# Patient Record
Sex: FEMALE | Race: BLACK OR AFRICAN AMERICAN | Employment: OTHER | URBAN - METROPOLITAN AREA
[De-identification: names, ages, dates, MRNs, and addresses within clinical notes are randomized per-mention and may not be internally consistent; named-entity substitution may affect disease eponyms.]

---

## 2023-01-16 ENCOUNTER — HOSPITAL ENCOUNTER (EMERGENCY)
Age: 23
Discharge: HOME | End: 2023-01-16
Attending: EMERGENCY MEDICINE
Payer: OTHER GOVERNMENT

## 2023-01-16 ENCOUNTER — APPOINTMENT (OUTPATIENT)
Dept: ULTRASOUND IMAGING | Age: 23
End: 2023-01-16
Attending: EMERGENCY MEDICINE
Payer: OTHER GOVERNMENT

## 2023-01-16 VITALS
RESPIRATION RATE: 20 BRPM | HEIGHT: 66 IN | DIASTOLIC BLOOD PRESSURE: 67 MMHG | TEMPERATURE: 98.6 F | WEIGHT: 153 LBS | SYSTOLIC BLOOD PRESSURE: 113 MMHG | OXYGEN SATURATION: 99 % | BODY MASS INDEX: 24.59 KG/M2 | HEART RATE: 121 BPM

## 2023-01-16 DIAGNOSIS — R10.9 ABDOMINAL PAIN AFFECTING PREGNANCY: Primary | ICD-10-CM

## 2023-01-16 DIAGNOSIS — O26.899 ABDOMINAL PAIN AFFECTING PREGNANCY: Primary | ICD-10-CM

## 2023-01-16 LAB
APPEARANCE UR: CLEAR
BACTERIA URNS QL MICRO: NEGATIVE /HPF
BILIRUB UR QL: NEGATIVE
COLOR UR: YELLOW
EPITH CASTS URNS QL MICRO: NORMAL /LPF
GLUCOSE UR STRIP.AUTO-MCNC: NEGATIVE MG/DL
HGB UR QL STRIP: NEGATIVE
KETONES UR QL STRIP.AUTO: NEGATIVE MG/DL
LEUKOCYTE ESTERASE UR QL STRIP.AUTO: NEGATIVE
NITRITE UR QL STRIP.AUTO: NEGATIVE
PH UR STRIP: 7 (ref 5–8)
PROT UR STRIP-MCNC: NEGATIVE MG/DL
RBC #/AREA URNS HPF: NORMAL /HPF (ref 0–5)
SP GR UR REFRACTOMETRY: 1 (ref 1–1.03)
SP GR UR REFRACTOMETRY: 1 (ref 1–1.03)
UROBILINOGEN UR QL STRIP.AUTO: 0.1 EU/DL (ref 0.1–1)
WBC URNS QL MICRO: NORMAL /HPF (ref 0–4)

## 2023-01-16 PROCEDURE — 76815 OB US LIMITED FETUS(S): CPT

## 2023-01-16 PROCEDURE — 81003 URINALYSIS AUTO W/O SCOPE: CPT

## 2023-01-16 PROCEDURE — 99284 EMERGENCY DEPT VISIT MOD MDM: CPT

## 2023-01-16 NOTE — ED PROVIDER NOTES
[facility] EMERGENCY DEPT  EMERGENCY DEPARTMENT HISTORY AND PHYSICAL EXAM      Date: 1/16/2023  Patient Name: Denisa Jha  MRN: [id number]  [place]: 2000  Date of evaluation: 1/16/2023  Provider: Torsten Castillo MD   Note Started: 3:41 PM 1/16/23    HISTORY OF PRESENT ILLNESS     Chief Complaint   Patient presents with    Pregnancy Problem    Vaginal Bleeding       History Provided By: Patient    HPI: Denisa Jha, 21 y.o. female presents to the emergency department complaint of lower abdominal cramping/pain x3 days. Patient ports some mild vaginal bleeding/spotting since last night. Patient states she is approximately 16 weeks pregnant, does not have local obstetrics that she is here for work for the next 4 months. Patient denies fevers or chills, denies nausea or vomiting. PAST MEDICAL HISTORY   Past Medical History:  Denies    Past Surgical History:  Denies    Family History:  No family history on file. Social History:  Visiting locally for work, employed    Allergies:  No Known Allergies    PCP: Other, MD Sydnee    Current Meds:   Previous Medications    No medications on file       REVIEW OF SYSTEMS   Review of Systems  Positives and Pertinent negatives as per HPI. PHYSICAL EXAM     ED Triage Vitals [01/16/23 1427]   ED Encounter Vitals Group      /67      Pulse (Heart Rate) (!) 121      Resp Rate 20      Temp 98.6 °F (37 °C)      Temp src       O2 Sat (%) 99 %      Weight 153 lb      Height 5' 6\"      Physical Exam  Physical Exam  Constitutional:       General: No acute distress. Appearance: Normal appearance. Not toxic-appearing. HENT:      Head: Normocephalic and atraumatic. Nose: Nose normal.      Mouth/Throat:      Mouth: Mucous membranes are moist.   Eyes:      Extraocular Movements: Extraocular movements intact. Pupils: Pupils are equal, round, and reactive to light. Cardiovascular:      Rate and Rhythm: Normal rate. Pulses: Normal pulses. Pulmonary:      Effort: Pulmonary effort is normal.      Breath sounds: No stridor. Abdominal:      General: Abdomen is flat. There is no distension. Musculoskeletal:         General: Normal range of motion. Cervical back: Normal range of motion and neck supple. Skin:     General: Skin is warm and dry. Capillary Refill: Capillary refill takes less than 2 seconds. Neurological:      General: No focal deficit present. Mental Status: Aert and oriented to person, place, and time. Psychiatric:         Mood and Affect: Mood normal.         Behavior: Behavior normal.     SCREENINGS               No data recorded        LAB, EKG AND DIAGNOSTIC RESULTS   Labs:  No results found for this or any previous visit (from the past 12 hour(s)). Radiologic Studies:  Non-plain film images such as CT, Ultrasound and MRI are read by the radiologist. Plain radiographic images are visualized and preliminarily interpreted by the ED Provider with the below findings:    *    Interpretation per the Radiologist below, if available at the time of this note:  [employer]    Result Date: 1/16/2023  INDICATION: 16 weeks pregnant, cramping and vaginal bleeding COMPARISON: None EXAM: Real-time transabdominal ultrasound of the gravid uterus. FINDINGS: A gravid uterus is demonstrated with anterior placenta showing uniform echotexture. Amniotic fluid volume is normal. There is transverse positioning of a single fetus. Fetal cardiac activity is shown measuring 161 bpm. Estimation of fetal age by measurement of biparietal diameter, head circumference, abdominal circumference and femur length yielding an estimated gestational age of 12 weeks, 4 days +/- 1 week 2-5 days. Fetal movement was shown. The cervix is not well evaluated on this study. The ovaries are not demonstrated, obscured by bowel gas and lack of urinary bladder distention, though no adnexal mass is shown. No free intrapelvic fluid is demonstrated. Viable 16 week, 4 day single intrauterine pregnancy. Cervical integrity is not determined on this study. ED COURSE and DIFFERENTIAL DIAGNOSIS/MDM   Vitals:    Vitals:    01/16/23 1427   BP: 113/67   Pulse: (!) 121   Resp: 20   Temp: 98.6 °F (37 °C)   SpO2: 99%   Weight: 69.4 kg (153 lb)   Height: 5' 6\" (1.676 m)        Patient was given the following medications:  Medications - No data to display    CONSULTS: (Who and What was discussed)  None     Chronic Conditions: None  Social Determinants affecting Dx or Tx: None  Counseling:      Records Reviewed (source and summary of external notes): Nursing Notes    CC/HPI Summary, DDx, ED Course, and Reassessment: Patient presents with lower abdominal cramping but no bleeding or discharge. Soft abdomen. Reassuring ultrasound and negative urinalysis. Patient is visiting here so does not have close obstetrics. Patient does states she has prenatal vitamins and Zofran at home as needed. We will give OB referral, return precautions. Disposition Considerations (Tests not done, Shared Decision Making, Pt Expectation of Test or Treatment.):  Discussed these findings with the patient. FINAL IMPRESSION     1. Abdominal pain affecting pregnancy          DISPOSITION/PLAN       Discharge Note: The patient is stable for discharge home. The signs, symptoms, diagnosis, and discharge instructions have been discussed, understanding conveyed, and agreed upon. The patient is to follow up as recommended or return to ER should their symptoms worsen. PATIENT REFERRED TO:  Follow-up Information    None           DISCHARGE MEDICATIONS:  There are no discharge medications for this patient. DISCONTINUED MEDICATIONS:  There are no discharge medications for this patient. I am the Primary Clinician of Record: Rosina Mcleod MD (electronically signed)    (Please note that parts of this dictation were completed with voice recognition software.  Quite often unanticipated grammatical, syntax, homophones, and other interpretive errors are inadvertently transcribed by the computer software. Please disregards these errors.  Please excuse any errors that have escaped final proofreading.)

## 2023-01-16 NOTE — DISCHARGE INSTRUCTIONS
Thank you! Thank you for allowing me to care for you in the emergency department. It is my goal to provide you with excellent care. If you have not received excellent quality care, please ask to speak to the nurse manager. Please fill out the survey that will come to you by mail or email since we listen to your feedback! Below you will find a list of your tests from today's visit. Should you have any questions, please do not hesitate to call the emergency department. Labs  Recent Results (from the past 12 hour(s))   URINALYSIS W/ RFLX MICROSCOPIC    Collection Time: 01/16/23  4:07 PM   Result Value Ref Range    Color Yellow     Appearance Clear Clear    Specific gravity 1.005 1.003 - 1.030      Specific gravity 1.005 1.003 - 1.030      pH (UA) 7.0 5.0 - 8.0      Protein Negative Negative mg/dL    Glucose Negative Negative mg/dL    Ketone Negative Negative mg/dL    Bilirubin Negative Negative      Blood Negative Negative      Urobilinogen 0.1 0.1 - 1.0 EU/dL    Nitrites Negative Negative      Leukocyte Esterase Negative Negative         Radiologic Studies  US PREG UTS LTD   Final Result   Viable 16 week, 4 day single intrauterine pregnancy. Cervical   integrity is not determined on this study. CT Results  (Last 48 hours)      None          CXR Results  (Last 48 hours)      None          ------------------------------------------------------------------------------------------------------------  The exam and treatment you received in the Emergency Department were for an urgent problem and are not intended as complete care. It is important that you follow-up with a doctor, nurse practitioner, or physician assistant to:  (1) confirm your diagnosis,  (2) re-evaluation of changes in your illness and treatment, and  (3) for ongoing care. Please take your discharge instructions with you when you go to your follow-up appointment.      If you have any problem arranging a follow-up appointment, contact the Emergency Department. If your symptoms become worse or you do not improve as expected and you are unable to reach your health care provider, please return to the Emergency Department. We are available 24 hours a day. If a prescription has been provided, please have it filled as soon as possible to prevent a delay in treatment. If you have any questions or reservations about taking the medication due to side effects or interactions with other medications, please call your primary care provider or contact the ER.

## 2023-01-16 NOTE — ED TRIAGE NOTES
States 16 weeks pregnant CHATO 7/1/2023. States started with cramping over the weekend, starting spotting last night. States soaking approx 1 pad an hour. Allie Wylie

## 2023-01-16 NOTE — Clinical Note
[facility] EMERGENCY DEPT  [address] [place] [id number]  [phone]    Work/School Note    Date: 1/16/2023    To Whom It May concern:    Cinthia Soliz was seen and treated today in the emergency room by the following provider(s):  Attending Provider: Fang Sanchez MD.      Cinthia Soliz is excused from work/school on 01/16/23 and 01/17/23. She is medically clear to return to work/school on 1/18/2023.        Sincerely,          Adithya Carpio MD

## 2023-01-18 ENCOUNTER — HOSPITAL ENCOUNTER (INPATIENT)
Age: 23
LOS: 1 days | Discharge: HOME | End: 2023-01-20
Attending: STUDENT IN AN ORGANIZED HEALTH CARE EDUCATION/TRAINING PROGRAM | Admitting: OBSTETRICS & GYNECOLOGY
Payer: OTHER GOVERNMENT

## 2023-01-18 ENCOUNTER — APPOINTMENT (OUTPATIENT)
Dept: ULTRASOUND IMAGING | Age: 23
End: 2023-01-18
Attending: STUDENT IN AN ORGANIZED HEALTH CARE EDUCATION/TRAINING PROGRAM
Payer: OTHER GOVERNMENT

## 2023-01-18 LAB
ANION GAP SERPL CALC-SCNC: 9 MMOL/L (ref 5–15)
BASOPHILS # BLD: 0 K/UL (ref 0–0.1)
BASOPHILS NFR BLD: 0 % (ref 0–1)
BUN SERPL-MCNC: 7 MG/DL (ref 6–20)
BUN/CREAT SERPL: 12 (ref 12–20)
CA-I BLD-MCNC: 9.1 MG/DL (ref 8.5–10.1)
CHLORIDE SERPL-SCNC: 103 MMOL/L (ref 97–108)
CO2 SERPL-SCNC: 22 MMOL/L (ref 21–32)
CREAT SERPL-MCNC: 0.58 MG/DL (ref 0.55–1.02)
DIFFERENTIAL METHOD BLD: ABNORMAL
EOSINOPHIL # BLD: 0.1 K/UL (ref 0–0.4)
EOSINOPHIL NFR BLD: 1 % (ref 0–7)
ERYTHROCYTE [DISTWIDTH] IN BLOOD BY AUTOMATED COUNT: 14.6 % (ref 11.5–14.5)
GLUCOSE SERPL-MCNC: 90 MG/DL (ref 65–100)
HCT VFR BLD AUTO: 30.8 % (ref 35–47)
HGB BLD-MCNC: 10.4 G/DL (ref 11.5–16)
IMM GRANULOCYTES # BLD AUTO: 0 K/UL (ref 0–0.04)
IMM GRANULOCYTES NFR BLD AUTO: 0 % (ref 0–0.5)
LYMPHOCYTES # BLD: 0.7 K/UL (ref 0.8–3.5)
LYMPHOCYTES NFR BLD: 7 % (ref 12–49)
MCH RBC QN AUTO: 27.8 PG (ref 26–34)
MCHC RBC AUTO-ENTMCNC: 33.8 G/DL (ref 30–36.5)
MCV RBC AUTO: 82.4 FL (ref 80–99)
MONOCYTES # BLD: 0.6 K/UL (ref 0–1)
MONOCYTES NFR BLD: 7 % (ref 5–13)
NEUTS SEG # BLD: 8.1 K/UL (ref 1.8–8)
NEUTS SEG NFR BLD: 85 % (ref 32–75)
NRBC # BLD: 0 K/UL (ref 0–0.01)
NRBC BLD-RTO: 0 PER 100 WBC
PLATELET # BLD AUTO: 146 K/UL (ref 150–400)
PMV BLD AUTO: 13.8 FL (ref 8.9–12.9)
POTASSIUM SERPL-SCNC: 4 MMOL/L (ref 3.5–5.1)
RBC # BLD AUTO: 3.74 M/UL (ref 3.8–5.2)
SODIUM SERPL-SCNC: 134 MMOL/L (ref 136–145)
WBC # BLD AUTO: 9.5 K/UL (ref 3.6–11)

## 2023-01-18 PROCEDURE — 36415 COLL VENOUS BLD VENIPUNCTURE: CPT

## 2023-01-18 PROCEDURE — 76815 OB US LIMITED FETUS(S): CPT

## 2023-01-18 PROCEDURE — 96375 TX/PRO/DX INJ NEW DRUG ADDON: CPT

## 2023-01-18 PROCEDURE — 96374 THER/PROPH/DIAG INJ IV PUSH: CPT

## 2023-01-18 PROCEDURE — 74011250636 HC RX REV CODE- 250/636: Performed by: STUDENT IN AN ORGANIZED HEALTH CARE EDUCATION/TRAINING PROGRAM

## 2023-01-18 PROCEDURE — 80048 BASIC METABOLIC PNL TOTAL CA: CPT

## 2023-01-18 PROCEDURE — 85025 COMPLETE CBC W/AUTO DIFF WBC: CPT

## 2023-01-18 PROCEDURE — 86900 BLOOD TYPING SEROLOGIC ABO: CPT

## 2023-01-18 PROCEDURE — 99285 EMERGENCY DEPT VISIT HI MDM: CPT

## 2023-01-18 RX ORDER — BUTORPHANOL TARTRATE 1 MG/ML
1 INJECTION INTRAMUSCULAR; INTRAVENOUS
Status: DISCONTINUED | OUTPATIENT
Start: 2023-01-19 | End: 2023-01-19

## 2023-01-18 RX ORDER — SODIUM CHLORIDE, SODIUM LACTATE, POTASSIUM CHLORIDE, CALCIUM CHLORIDE 600; 310; 30; 20 MG/100ML; MG/100ML; MG/100ML; MG/100ML
125 INJECTION, SOLUTION INTRAVENOUS CONTINUOUS
Status: DISCONTINUED | OUTPATIENT
Start: 2023-01-19 | End: 2023-01-20 | Stop reason: HOSPADM

## 2023-01-18 RX ORDER — MORPHINE SULFATE 4 MG/ML
4 INJECTION INTRAVENOUS ONCE
Status: COMPLETED | OUTPATIENT
Start: 2023-01-18 | End: 2023-01-18

## 2023-01-18 RX ADMIN — MORPHINE SULFATE 4 MG: 4 INJECTION, SOLUTION INTRAMUSCULAR; INTRAVENOUS at 23:13

## 2023-01-18 RX ADMIN — SODIUM CHLORIDE 1000 ML: 9 INJECTION, SOLUTION INTRAVENOUS at 23:14

## 2023-01-19 PROBLEM — Z34.90 PREGNANCY: Status: ACTIVE | Noted: 2023-01-19

## 2023-01-19 PROBLEM — O03.9 FETAL DEMISE DUE TO MISCARRIAGE: Status: ACTIVE | Noted: 2023-01-19

## 2023-01-19 LAB
ABO + RH BLD: NORMAL
BLOOD GROUP ANTIBODIES SERPL: NEGATIVE
SPECIMEN EXP DATE BLD: NORMAL

## 2023-01-19 PROCEDURE — 75410000002 HC LABOR FEE PER 1 HR

## 2023-01-19 PROCEDURE — 96366 THER/PROPH/DIAG IV INF ADDON: CPT

## 2023-01-19 PROCEDURE — G0378 HOSPITAL OBSERVATION PER HR: HCPCS

## 2023-01-19 PROCEDURE — 75410000000 HC DELIVERY VAGINAL/SINGLE

## 2023-01-19 PROCEDURE — 96365 THER/PROPH/DIAG IV INF INIT: CPT

## 2023-01-19 PROCEDURE — 74011000250 HC RX REV CODE- 250: Performed by: OBSTETRICS & GYNECOLOGY

## 2023-01-19 PROCEDURE — 87389 HIV-1 AG W/HIV-1&-2 AB AG IA: CPT

## 2023-01-19 PROCEDURE — 74011250636 HC RX REV CODE- 250/636

## 2023-01-19 PROCEDURE — 96376 TX/PRO/DX INJ SAME DRUG ADON: CPT

## 2023-01-19 PROCEDURE — 36415 COLL VENOUS BLD VENIPUNCTURE: CPT

## 2023-01-19 PROCEDURE — 96372 THER/PROPH/DIAG INJ SC/IM: CPT

## 2023-01-19 PROCEDURE — 88305 TISSUE EXAM BY PATHOLOGIST: CPT

## 2023-01-19 PROCEDURE — 96375 TX/PRO/DX INJ NEW DRUG ADDON: CPT

## 2023-01-19 PROCEDURE — 74011250636 HC RX REV CODE- 250/636: Performed by: OBSTETRICS & GYNECOLOGY

## 2023-01-19 PROCEDURE — 65410000002 HC RM PRIVATE OB

## 2023-01-19 PROCEDURE — 99221 1ST HOSP IP/OBS SF/LOW 40: CPT | Performed by: OBSTETRICS & GYNECOLOGY

## 2023-01-19 PROCEDURE — 75410000003 HC RECOV DEL/VAG/CSECN EA 0.5 HR

## 2023-01-19 PROCEDURE — 86592 SYPHILIS TEST NON-TREP QUAL: CPT

## 2023-01-19 RX ORDER — IBUPROFEN 800 MG/1
800 TABLET ORAL
Status: DISCONTINUED | OUTPATIENT
Start: 2023-01-19 | End: 2023-01-20 | Stop reason: HOSPADM

## 2023-01-19 RX ORDER — METHYLERGONOVINE MALEATE 0.2 MG/ML
INJECTION INTRAVENOUS
Status: COMPLETED
Start: 2023-01-19 | End: 2023-01-19

## 2023-01-19 RX ORDER — ONDANSETRON 2 MG/ML
4 INJECTION INTRAMUSCULAR; INTRAVENOUS
Status: DISCONTINUED | OUTPATIENT
Start: 2023-01-19 | End: 2023-01-20 | Stop reason: HOSPADM

## 2023-01-19 RX ORDER — NALOXONE HYDROCHLORIDE 0.4 MG/ML
0.4 INJECTION, SOLUTION INTRAMUSCULAR; INTRAVENOUS; SUBCUTANEOUS AS NEEDED
Status: DISCONTINUED | OUTPATIENT
Start: 2023-01-19 | End: 2023-01-19

## 2023-01-19 RX ORDER — OXYTOCIN/RINGER'S LACTATE 30/500 ML
87.3 PLASTIC BAG, INJECTION (ML) INTRAVENOUS AS NEEDED
Status: COMPLETED | OUTPATIENT
Start: 2023-01-19 | End: 2023-01-19

## 2023-01-19 RX ORDER — OXYTOCIN/RINGER'S LACTATE 30/500 ML
PLASTIC BAG, INJECTION (ML) INTRAVENOUS
Status: DISCONTINUED
Start: 2023-01-19 | End: 2023-01-19

## 2023-01-19 RX ORDER — ONDANSETRON 2 MG/ML
INJECTION INTRAMUSCULAR; INTRAVENOUS
Status: DISCONTINUED
Start: 2023-01-19 | End: 2023-01-19

## 2023-01-19 RX ORDER — BUTORPHANOL TARTRATE 1 MG/ML
INJECTION INTRAMUSCULAR; INTRAVENOUS
Status: COMPLETED
Start: 2023-01-19 | End: 2023-01-19

## 2023-01-19 RX ORDER — METHYLERGONOVINE MALEATE 0.2 MG/ML
0.2 INJECTION INTRAVENOUS ONCE
Status: CANCELLED | OUTPATIENT
Start: 2023-01-19 | End: 2023-01-19

## 2023-01-19 RX ORDER — OXYCODONE AND ACETAMINOPHEN 5; 325 MG/1; MG/1
1 TABLET ORAL
Status: DISCONTINUED | OUTPATIENT
Start: 2023-01-19 | End: 2023-01-20 | Stop reason: HOSPADM

## 2023-01-19 RX ORDER — METHYLERGONOVINE MALEATE 0.2 MG/ML
0.2 INJECTION INTRAVENOUS ONCE
Status: COMPLETED | OUTPATIENT
Start: 2023-01-19 | End: 2023-01-19

## 2023-01-19 RX ORDER — OXYTOCIN/RINGER'S LACTATE 30/500 ML
1 PLASTIC BAG, INJECTION (ML) INTRAVENOUS
Status: DISCONTINUED | OUTPATIENT
Start: 2023-01-19 | End: 2023-01-19

## 2023-01-19 RX ORDER — BUTORPHANOL TARTRATE 1 MG/ML
1 INJECTION INTRAMUSCULAR; INTRAVENOUS
Status: DISCONTINUED | OUTPATIENT
Start: 2023-01-19 | End: 2023-01-19

## 2023-01-19 RX ORDER — OXYTOCIN/RINGER'S LACTATE 30/500 ML
1 PLASTIC BAG, INJECTION (ML) INTRAVENOUS CONTINUOUS
Status: DISCONTINUED | OUTPATIENT
Start: 2023-01-19 | End: 2023-01-19

## 2023-01-19 RX ORDER — OXYTOCIN/RINGER'S LACTATE 30/500 ML
10 PLASTIC BAG, INJECTION (ML) INTRAVENOUS AS NEEDED
Status: COMPLETED | OUTPATIENT
Start: 2023-01-19 | End: 2023-01-19

## 2023-01-19 RX ORDER — OXYTOCIN/RINGER'S LACTATE 30/500 ML
87.3 PLASTIC BAG, INJECTION (ML) INTRAVENOUS CONTINUOUS
Status: DISCONTINUED | OUTPATIENT
Start: 2023-01-19 | End: 2023-01-19

## 2023-01-19 RX ADMIN — BUTORPHANOL TARTRATE 1 MG: 1 INJECTION, SOLUTION INTRAMUSCULAR; INTRAVENOUS at 02:10

## 2023-01-19 RX ADMIN — Medication: at 05:40

## 2023-01-19 RX ADMIN — BUTORPHANOL TARTRATE 1 MG: 1 INJECTION, SOLUTION INTRAMUSCULAR; INTRAVENOUS at 00:33

## 2023-01-19 RX ADMIN — SODIUM CHLORIDE, POTASSIUM CHLORIDE, SODIUM LACTATE AND CALCIUM CHLORIDE 125 ML/HR: 600; 310; 30; 20 INJECTION, SOLUTION INTRAVENOUS at 00:05

## 2023-01-19 RX ADMIN — METHYLERGONOVINE MALEATE 0.2 MG: 0.2 INJECTION INTRAVENOUS at 06:43

## 2023-01-19 RX ADMIN — Medication 87.3 MILLI-UNITS/MIN: at 05:51

## 2023-01-19 RX ADMIN — Medication 1 MILLI-UNITS/MIN: at 04:09

## 2023-01-19 RX ADMIN — CEFAZOLIN 2 G: 1 INJECTION, POWDER, FOR SOLUTION INTRAMUSCULAR; INTRAVENOUS at 07:44

## 2023-01-19 NOTE — PROCEDURES
Delivery Note    Obstetrician:  Corby Henderson MD    Assistant: none    Pre-Delivery Diagnosis: Previable and Single fetus, 16 weeks     Post-Delivery Diagnosis: Stillborn infant(s)    Intrapartum Event: None    Procedure: Spontaneous vaginal delivery    Epidural: NO    Monitor:  None    Indications for instrumental delivery: none    Estimated Blood Loss: 150cc    Episiotomy: n/a    Laceration(s):  n/a    Laceration(s) repair: NO      Apgar - One Minute: 0    Apgar - Five Minutes: 0      Specimens: placenta and cord  Complications:  none           Cord Blood Results:   Information for the patient's :  Raeann Dionisio Pending [[id number]]   No results found for: PCTABR, PCTDIG, BILI, [address]   Prenatal Labs:     Lab Results   Component Value Date/Time    ABO/Rh(D) AB Positive 2023 11:11 PM        Attending Attestation: I was present and scrubbed for the entire procedure

## 2023-01-19 NOTE — PROGRESS NOTES
2354-Arrival to unit via stretcher from ED. Pt transferred to labor bed and placed on monitors. Pt states that she saw OBGYN in [place] and is here for [employer]. Reports going to a free standing ED for vaginal bleeding and cramping on the 16th that had began the day before. This bleeding has no subsided, and is having lower abdominal cramping every several minutes. Denies any leaking of fluids, headaches, changes in vision. Pt states that she will allow her mother Joel Baez and sister Mena Aguilar to visit. She will inform us if anyone else may visit. 0020-Per patient request, informed mother of patient status over phone Joel Baez using pt cell phone. 0050-Chaplain Frias at bedside. 0120-Reviewed fall risk precautions and pt declined urine drug screen at this time. 0304-Pt called and stated that she felt like she urinated or fluids came out vaginally. Upon assessment, fetus noted to be between legs. Cord was clamped and cut. Fetus was removed from room, states that she does not want to hold or see fetus at this time, and requests to have removed from room. Placenta remains undelivered, no active bleeding or clots  noted, underpads were changed advised pt to inform staff immediately if felt changes. Fetus was removed from pt room, weight and measurments completed at this time. 0400-Satnam George informed of pt status, orders rcvd. 0410-Pt declined genetic testing. 0430- paged. 0437- at bedside. 0530-Satnam George at bedside. 0537-Placenta delivered, to be sent to pathology. 0554-Phone call rcvd from [facility] to locate pt, informed pt of phone call and she asked staff to inform her of location, status of delivery and time to recover, Caller was informed. 0615-Two visitors presented in ED, her \"martinez buddies\". Asked pt if she wishes to have them visit and she states no thank you.     0715-Bedside shift report given to oncoming RN

## 2023-01-19 NOTE — PROGRESS NOTES
Spiritual Care Assessment/Progress Note  [facility]      NAME: Mary Friedman      MRN: [id number]  AGE: 21 y.o.  SEX: female  Synagogue Affiliation: [employer]   Language: English     2023     Total Time (in minutes): 35     Spiritual Assessment begun in [address] [place] through conversation with:         [x]Patient        [] Family    [] Friend(s)        Reason for Consult: Death,  loss     Spiritual beliefs: (Please include comment if needed)     [] Identifies with a gumaro tradition:         [] Supported by a gumaro community:            [] Claims no spiritual orientation:           [] Seeking spiritual identity:                [x] Adheres to an individual form of spirituality:           [] Not able to assess:                           Identified resources for coping:      [x] Prayer                               [] Music                  [] Guided Imagery     [x] Family/friends                 [] Pet visits     [] Devotional reading                         [] Unknown     [] Other:                                               Interventions offered during this visit: (See comments for more details)    Patient Interventions: Affirmation of emotions/emotional suffering, Affirmation of gumaro, Initial/Spiritual assessment, patient floor, Prayer (actual),  loss           Plan of Care:     [x] Support spiritual and/or cultural needs    [] Support AMD and/or advance care planning process      [x] Support grieving process   [] Coordinate Rites and/or Rituals    [] Coordination with community clergy   [] No spiritual needs identified at this time   [] Detailed Plan of Care below (See Comments)  [] Make referral to Music Therapy  [] Make referral to Pet Therapy     [] Make referral to Addiction services  [] Make referral to [facility]  [] Make referral to Spiritual Care Partner  [] No future visits requested        [] Contact Spiritual Care for further referrals     Comments: Patient seen by  intern Magnolia Weldon in response to page from nursing staff, who reports patient has pain, suffering related to medical diagnosis. Patient resting in bed making groaning sounds. Expressed sadness, pain and suffering related to reason for hospitalization. Mother on phone-family preparing to come within hours. Shared feelings regarding spiritual beliefs and believes in God. Voiced happiness at being supported by father of baby and that she's completed college at an out of [employer] university.  sat with patient and listened attentively affirming gumaro and normalization of emotions of pain and suffering. Encouraged patient with prayer at her request. Spiritual care Services will follow-up if needed. Magnolia Weldon M.Div.    Intern

## 2023-01-19 NOTE — PROGRESS NOTES
[id number] Patient moved to [facility] room 319. Report given to [facility].

## 2023-01-19 NOTE — ED PROVIDER NOTES
[address]  EMERGENCY DEPARTMENT ENCOUNTER NOTE        Date: 2023  Patient Name: Ekta Alonso      History of Presenting Illness     Chief Complaint   Patient presents with    Vaginal Bleeding       History Provided By: Patient    HPI: Ekta Alonso, 21 y.o. female with  @ 12 weeks gestation who comes to the ED with worsening of lower abdominal cramps and vaginal bleeding. Patient was seen here on 2023 for similar complaints. She comes today due to worsening symptoms. She denies pain anywhere else. She denies any nausea, vomiting, fever, or chills. No dysuria, hematuria, foul-smelling urine, or any changes in her bowel dietary habits. PCP: Other, MD Sydnee    Current Facility-Administered Medications   Medication Dose Route Frequency Provider Last Rate Last Admin    butorphanol (STADOL) injection 1 mg  1 mg IntraVENous Q1H PRN Norman Guaman MD        butorphanol (STADOL) 1 mg/mL injection             lactated Ringers infusion  125 mL/hr IntraVENous CONTINUOUS Norman Guaman  mL/hr at 23 0003 125 mL/hr at 23 0003       Past History     Past Medical History:  Past Medical History:   Diagnosis Date    Thyroid activity decreased        Past Surgical History:  History reviewed. No pertinent surgical history. Family History:  History reviewed. No pertinent family history. Social History:  Social History     Tobacco Use    Smoking status: Never    Smokeless tobacco: Never   Vaping Use    Vaping Use: Never used   Substance Use Topics    Alcohol use: Never    Drug use: Never       Allergies:  No Known Allergies      Review of Systems     Review of Systems    A 10 point review of system was performed and was negative except as noted above in HPI    Physical Exam     Physical Exam  Vitals and nursing note reviewed. Constitutional:       General: She is in acute distress.       Appearance: She is not ill-appearing, toxic-appearing or diaphoretic. HENT:      Head: Normocephalic and atraumatic. Eyes:      Extraocular Movements: Extraocular movements intact. Conjunctiva/sclera: Conjunctivae normal.   Cardiovascular:      Rate and Rhythm: Regular rhythm. Tachycardia present. Pulmonary:      Effort: Pulmonary effort is normal.      Breath sounds: Normal breath sounds. Abdominal:      Palpations: Abdomen is soft. Tenderness: There is abdominal tenderness in the suprapubic area. Genitourinary:     Exam position: Supine. Vagina: Bleeding present. Cervix: Cervical bleeding present. Adnexa: Right adnexa normal and left adnexa normal.   Neurological:      Mental Status: She is alert. Lab and Diagnostic Study Results     Labs -     Recent Results (from the past 12 hour(s))   CBC WITH AUTOMATED DIFF    Collection Time: 01/18/23 11:11 PM   Result Value Ref Range    WBC 9.5 3.6 - 11.0 K/uL    RBC 3.74 (L) 3.80 - 5.20 M/uL    HGB 10.4 (L) 11.5 - 16.0 g/dL    HCT 30.8 (L) 35.0 - 47.0 %    MCV 82.4 80.0 - 99.0 FL    MCH 27.8 26.0 - 34.0 PG    MCHC 33.8 30.0 - 36.5 g/dL    RDW 14.6 (H) 11.5 - 14.5 %    PLATELET 400 (L) 088 - 400 K/uL    MPV 13.8 (H) 8.9 - 12.9 FL    NRBC 0.0 0.0  WBC    ABSOLUTE NRBC 0.00 0.00 - 0.01 K/uL    NEUTROPHILS 85 (H) 32 - 75 %    LYMPHOCYTES 7 (L) 12 - 49 %    MONOCYTES 7 5 - 13 %    EOSINOPHILS 1 0 - 7 %    BASOPHILS 0 0 - 1 %    IMMATURE GRANULOCYTES 0 0 - 0.5 %    ABS. NEUTROPHILS 8.1 (H) 1.8 - 8.0 K/UL    ABS. LYMPHOCYTES 0.7 (L) 0.8 - 3.5 K/UL    ABS. MONOCYTES 0.6 0.0 - 1.0 K/UL    ABS. EOSINOPHILS 0.1 0.0 - 0.4 K/UL    ABS. BASOPHILS 0.0 0.0 - 0.1 K/UL    ABS. IMM.  GRANS. 0.0 0.00 - 0.04 K/UL    DF AUTOMATED     TYPE & SCREEN    Collection Time: 01/18/23 11:11 PM   Result Value Ref Range    Crossmatch Expiration 01/21/2023,2352     ABO/Rh(D) AB Positive     Antibody screen Negative    METABOLIC PANEL, BASIC    Collection Time: 01/18/23 11:11 PM   Result Value Ref Range    Sodium 134 (L) 136 - 145 mmol/L    Potassium 4.0 3.5 - 5.1 mmol/L    Chloride 103 97 - 108 mmol/L    CO2 22 21 - 32 mmol/L    Anion gap 9 5 - 15 mmol/L    Glucose 90 65 - 100 mg/dL    BUN 7 6 - 20 mg/dL    Creatinine 0.58 0.55 - 1.02 mg/dL    BUN/Creatinine ratio 12 12 - 20      eGFR >60 >60 ml/min/1.73m2    Calcium 9.1 8.5 - 10.1 mg/dL       Radiologic Studies -   [unfilled]  CT Results  (Last 48 hours)      None          CXR Results  (Last 48 hours)      None            Medical Decision Making and ED Course   - I am the first and primary provider for this patient AND AM THE PRIMARY PROVIDER OF RECORD. - I reviewed the vital signs, available nursing notes, past medical history, past surgical history, family history and social history. - Initial assessment performed. The patients presenting problems have been discussed, and the staff are in agreement with the care plan formulated and outlined with them. I have encouraged them to ask questions as they arise throughout their visit. Vital Signs-Reviewed the patient's vital signs. Patient Vitals for the past 24 hrs:   Temp Pulse Resp BP SpO2   01/18/23 2306 99.3 °F (37.4 °C) (!) 114 16 105/62 100 %       Records Reviewed: Nursing Notes and Old Medical Records      Medical Decision Making     Patient is 77-year-old female G1, P0 at approximately 16 weeks gestation comes to the ED with worsening vaginal bleeding and vaginal discharge. She was seen here couple days ago for vaginal bleeding in which at the time had an ultrasound which was unremarkable for acute finding. She did also have urine sent which was negative for signs of infection. In the setting of her worsening symptoms we will obtain CBC, chemistry, type and screen. She had significant bleeding on my evaluation I was unable to see the cervix clearly. Thus, we will get an ultrasound with concern for possible active miscarriage.   Additionally, given her tachycardia which possibly secondary to dehydration or pain we will treat with intravenous fluid and analgesics. ED Course & Reassessment     Medications ordered:  Medications   lactated Ringers infusion (125 mL/hr IntraVENous New Bag 1/19/23 0003)   butorphanol (STADOL) injection 1 mg (has no administration in time range)   butorphanol (STADOL) 1 mg/mL injection (has no administration in time range)   sodium chloride 0.9 % bolus infusion 1,000 mL (1,000 mL IntraVENous New Bag 1/18/23 2314)   morphine injection 4 mg (4 mg IntraVENous Given 1/18/23 2313)       ED Course:     I received a phone call from the ultrasound tech. I went to the ultrasound suite and evaluate the patient as well. I reviewed and interpreted the ultrasound  images which shows the cervix is dilated with the fetal vertex down to her cervix. This is concerning for active miscarriage. Fetal heart tones are within normal.  Immediately consulted obstetrics Dr. Jak Guo. Patient was transferred to L&D. Diagnosis     Clinical Impression:   Vaginal bleeding  Impending miscarriage    Disposition     Disposition: Condition unchanged    Send to L&D    Attestations: Luis Lomeli MD    Please note that this dictation was completed with [employer], the computer voice recognition software. Quite often unanticipated grammatical, syntax, homophones, and other interpretive errors are inadvertently transcribed by the computer software. Please disregard these errors. Please excuse any errors that have escaped final proofreading. Thank you.

## 2023-01-19 NOTE — PROGRESS NOTES
[address] [facility] notified of pt arrival and impending miscarriage. Pt is currently emotional.  Writer requesting time to explain to pt what to expect with delivery prior to  visit. Writer to notify  after pt information given. [facility] notified for pt visit.

## 2023-01-19 NOTE — PROGRESS NOTES
TRANSFER - IN REPORT:    Verbal report received from [place] on Patricia Eller  being received from L&D(unit) for routine progression of care      Report consisted of patients Situation, Background, Assessment and   Recommendations(SBAR). Information from the following report(s) SBAR was reviewed with the receiving nurse. Opportunity for questions and clarification was provided. Assessment completed upon patients arrival to unit and care assumed. 1153 Transferred from L&D. Room orientation completed.       46 Spoke with patient regarding options for fetal demise, discussed cremation and private burial.     0606 Patient's family at bedside  Problem: Fetal Demise Care Plan  Goal: *Able to cope  Outcome: Progressing Towards Goal

## 2023-01-19 NOTE — PROGRESS NOTES
Patient seen by Aurea Pinto as follow-up response to  loss. Patient states she will be okay and believes God gives life. Expressed she is not ready and does not wish to talk about things at this time but thankful to have  check on her. Shared she's expecting family to come shortly. Sat with patient and listened attentively affirming emotions of sadness and spiritual beliefs. Offered a prayer. Aurea Pinto M.Div.    Intern

## 2023-01-20 VITALS
RESPIRATION RATE: 16 BRPM | WEIGHT: 160 LBS | HEIGHT: 66 IN | OXYGEN SATURATION: 100 % | TEMPERATURE: 97.5 F | SYSTOLIC BLOOD PRESSURE: 103 MMHG | HEART RATE: 88 BPM | BODY MASS INDEX: 25.71 KG/M2 | DIASTOLIC BLOOD PRESSURE: 68 MMHG

## 2023-01-20 LAB
HIV 1+2 AB+HIV1 P24 AG SERPL QL IA: NONREACTIVE
HIV12 RESULT COMMENT, HHIVC: NORMAL
RPR SER QL: NONREACTIVE

## 2023-01-20 RX ORDER — IBUPROFEN 800 MG/1
800 TABLET ORAL
Qty: 40 TABLET | Refills: 0 | Status: SHIPPED | OUTPATIENT
Start: 2023-01-20

## 2023-01-20 NOTE — PROGRESS NOTES
1030 Discharge instructions reviewed with patient. Patient given Rx for motrin and aware when can take next dose. Aware of warning signs and when to notify MD.     Discharge plan of care/case management plan validated with provider discharge order. EDPS screening completed and patient scored a 4, no thoughts of harming herself. Memento Box given with footprints, ring, small blanket, and pictures (okay with patient). Grieving folder given with community resources. Consent was obtained for infant cremation, patient has given permission for her sister to  the remains when the time comes due to her going home to [place]. Patients sister is aware she will need to bring a photo ID of herself. Her sisters name was added to the summary form by the patient for approval for . 1059 Patient discharged to main entrance via wheelchair in stable condition .     Problem: Fetal Demise Care Plan  Goal: *Able to cope  Outcome: Resolved/Met     Problem: Patient Education: Go to Patient Education Activity  Goal: Patient/Family Education  Outcome: Resolved/Met     Problem: Vaginal Delivery: Postpartum Day 1  Goal: Activity/Safety  Outcome: Resolved/Met  Goal: Nutrition/Diet  Outcome: Resolved/Met  Goal: Discharge Planning  Outcome: Resolved/Met  Goal: Medications  Outcome: Resolved/Met  Goal: Treatments/Interventions/Procedures  Outcome: Resolved/Met  Goal: Psychosocial  Outcome: Resolved/Met  Goal: *Vital signs within defined limits  Outcome: Resolved/Met  Goal: *Hemodynamically stable  Outcome: Resolved/Met  Goal: *Optimal pain control at patient's stated goal  Outcome: Resolved/Met  Goal: *Demonstrates progressive activity  Outcome: Resolved/Met  Goal: *Tolerating diet  Outcome: Resolved/Met

## 2023-01-20 NOTE — PROGRESS NOTES
Progress Note    Patient: Leo Ramirez MRN: [id number]  SSN: [id number]    YOB: 2000  Age: 21 y.o.   Sex: female      Admit Date: 2023    LOS: 1 day     Subjective:     Patient is without complaints, she reports minimal lochia    Objective:     Vitals:    23 0930 23 1529 23 2308 23 0730   BP: 108/63 (!) 97/58 103/60 103/68   Pulse: 97 80 82 88   Resp: 18  16   Temp: 98.2 °F (36.8 °C) 99 °F (37.2 °C) 98.4 °F (36.9 °C)    SpO2: 100% 100% 99% 100%   Weight:       Height:            Physical Exam:   Fundus is quite low and barely palpable  Extremities without clubbing cyanosis or edema  Lab/Data Review:  No new lab    Assessment:     Active Problems:    Pregnancy (2023)      Fetal demise due to miscarriage (2023)    Status post inevitable  delivery with nonviable infant    Plan:     Discharge to home today    Signed By: Siegfried Crigler, MD     2023

## 2023-01-25 NOTE — H&P
History & Physical    Name: Zunilda Tran MRN: [id number]  SSN: [id number]    YOB: 2000  Age: 21 y.o. Sex: female        Subjective:     Estimated Date of Delivery: 23  OB History    Para Term  AB Living   1 1           SAB IAB Ectopic Molar Multiple Live Births           0        # Outcome Date GA Lbr Chano/2nd Weight Sex Delivery Anes PTL Lv   1 Para 23 16w5d  4.1 oz (0.116 kg)  Vag-Spont None  FD       Ms. Lorenzo Del Castillo is admitted with pregnancy at 16w5d. Inevitable  per ultrasound     Past Medical History:   Diagnosis Date    Thyroid activity decreased      History reviewed. No pertinent surgical history. Social History     Occupational History    Not on file   Tobacco Use    Smoking status: Never    Smokeless tobacco: Never   Vaping Use    Vaping Use: Never used   Substance and Sexual Activity    Alcohol use: Never    Drug use: Never    Sexual activity: Not on file     History reviewed. No pertinent family history. No Known Allergies  Prior to Admission medications    Medication Sig Start Date End Date Taking? Authorizing Provider   ibuprofen (MOTRIN) 800 mg tablet Take 1 Tablet by mouth every six (6) hours as needed for Pain. 23  Yes Jonel Cummins MD        Review of Systems: A comprehensive review of systems was negative except for that written in the HPI. Objective:     Vitals:  Vitals:    23 0930 23 1529 23 2308 23 0730   BP: 108/63 (!) 97/58 103/60 103/68   Pulse: 97 80 82 88   Resp: 18 16 18 16   Temp: 98.2 °F (36.8 °C) 99 °F (37.2 °C) 98.4 °F (36.9 °C) 97.5 °F (36.4 °C)   SpO2: 100% 100% 99% 100%   Weight:       Height:            Physical Exam:  Patient without distress.   Abdomen: soft, nontender  Fundus: soft and non tender  Membranes:  Intact    Prenatal Labs:   Lab Results   Component Value Date/Time    ABO/Rh(D) AB Positive 2023 11:11 PM         Assessment/Plan:     Active Problems:    Pregnancy (2023) Fetal demise due to miscarriage (1/19/2023)         Plan: Admit for to labor and delivery  Anticipate vaginal delivery

## 2023-01-26 NOTE — ADT AUTH CERT NOTES
Facility Name: [facility]                                 Patient Demographics    Patient Name   Johanny Corona Sex   Female    2000 Address   [address] [address] Phone   [phone] [facility])       Hospital Account    Name Acct ID Class Status Primary Coverage   Scott Ontiveros [id number] INPATIENT Discharged/Not Billed  Charity Bertin [address] 74            Guarantor Account (for Hospital Account [de-identified])    Name Relation to Pt Service Area Active? Acct Type   Scott Ontiveros Self [facility] Yes Personal/Family   Address Phone     [address], [place] [address] [address] [phone](E)              Coverage Information (for Hospital Account [de-identified])      1. /[facility]    F/O Payor/Plan Subscriber  Subscriber Sex Precert #   /[facility] 00 F    Subscriber Subscriber #   Scott Ontiveros [id number]   Grp # Group Name       Address Phone   [address] [place] [address]    Policy Number Status Effective Date Benefits Phone   [id number] -  -   Auth/Cert   [id number]     2. MEDICAID [facility]/[employer] MEDICAID [facility]    F/O Payor/Plan Subscriber  Subscriber Sex Precert #   MEDICAID [facility]/[employer] MEDICAID [facility] 00 F    Subscriber Subscriber #   Scott Ontiveros [id number]   Grp # Group Name       Address Phone   [address] [phone]   Policy Number Status Effective Date Benefits Phone   [id number] -  -   Auth/Cert                   Admission Information    Arrival Date/Time: 2023 2303 Admit Date/Time: 2023 2303 IP Adm.  Date/Time: 2023 0755   Admission Type: Emergency Point of Origin: Non-health Care Facility/self Admit Category:    Means of Arrival: Ambulance Primary Service: Obstetrics Secondary Service: N/A   Transfer Source:  Service Area: [facility] Unit: [address] [place] [address] INFANT   Admit Provider: Konstantin Mendoza MD Attending Provider: Nixon Benson MD Referring Provider:      Admission Information    Attending Provider Admission Dx Admitted on    Pregnancy, Fetal demise due to miscarriage 23   Service Isolation Code Status   OBSTETRICS -- Prior   Allergies Advance Care Planning    No Known Allergies Jump to the Activity         Admission Information    Unit/Bed: [facility] 3E MOTHER INFANT Service: OBSTETRICS   Admitting provider: Konstantin Mendoza MD Phone: [phone]   Attending provider:  Phone:    PCP: Amor Hull MD Phone:    Admission dx: z34.90 Patient class: I   Admission type: ER         Patient Demographics    Patient Name   Oscar Mendes   [id number] Legal Sex   Female    2000 Address   [address] [place] [address] Road [address] Phone   [phone] (Home)       H&P Notes       H&P by Konstantin Mendoza MD at 23 1052 documented on ED to Hosp-Admission (Discharged) from 2023 in [address] [place] [address]    Author: Konstantin Mendoza MD Author Type: Physician Filed: 23 0646   Note Status: Signed Cosign: Cosign Not Required Date of Service: 23 105   : Konstantin Mendoza MD (Physician)               History & Physical     Name: Trenton Griffiths MRN: [id number]  SSN: [id number]    YOB: 2000  Age: 21 y.o. Sex: female          Subjective:      Estimated Date of Delivery: 23                    OB History    Para Term  AB Living   1 1           SAB IAB Ectopic Molar Multiple Live Births            0         # Outcome Date GA Lbr Chano/2nd Weight Sex Delivery Anes PTL Lv   1 Para 23 16w5d   4.1 oz (0.116 kg)   Vag-Spont None   FD         Ms. Anamika Olvera is admitted with pregnancy at 16w5d. Inevitable  per ultrasound           Past Medical History:   Diagnosis Date    Thyroid activity decreased        History reviewed. No pertinent surgical history.   Social History           Occupational History    Not on file   Tobacco Use    Smoking status: Never    Smokeless tobacco: Never   Vaping Use    Vaping Use: Never used   Substance and Sexual Activity    Alcohol use: Never    Drug use: Never    Sexual activity: Not on file      History reviewed. No pertinent family history. No Known Allergies          Prior to Admission medications    Medication Sig Start Date End Date Taking? Authorizing Provider   ibuprofen (MOTRIN) 800 mg tablet Take 1 Tablet by mouth every six (6) hours as needed for Pain. 23   Yes Jonel Cummins MD         Review of Systems: A comprehensive review of systems was negative except for that written in the HPI. Objective:      Vitals:         Vitals:     23 0930 23 1529 23 2308 23 0730   BP: 108/63 (!) 97/58 103/60 103/68   Pulse: 97 80 82 88   Resp: 18 16 18 16   Temp: 98.2 °F (36.8 °C) 99 °F (37.2 °C) 98.4 °F (36.9 °C) 97.5 °F (36.4 °C)   SpO2: 100% 100% 99% 100%   Weight:           Height:                 Physical Exam:  Patient without distress.   Abdomen: soft, nontender  Fundus: soft and non tender  Membranes:  Intact     Prenatal Labs:         Lab Results   Component Value Date/Time     ABO/Rh(D) AB Positive 2023 11:11 PM            Assessment/Plan:      Active Problems:    Pregnancy (2023)       Fetal demise due to miscarriage (2023)           Plan: Admit for to labor and delivery  Anticipate vaginal delivery                       Patient Demographics    Patient Name   Ashish Bevel   [id number] Legal Sex   Female    2000 Address   [address] [place] [address] Road [address] Phone   [phone] (Home)     CSN:   [id number]     Admit Date: Admit Time Room Bed   2023 11:03 PM [id number]       Attending Providers    Provider Pager From To   Bautista Toussaint MD  23   Danna Beach MD  23   Vanesa Huston MD  23       Patient Contacts    Name Kevin Vaz 81 Mother [phone]         =======================================================================  [employer]          [address]  [address] [facility] [address]  [phone]       Patient: Carli Larson  MRN: [id number]  :2000      Hector Mackenzie     MRN: [id number]     Link to Baby  Comment        [de-identified] name MRN Account  Age Sex Admission Type   Annamaria Robison Pending FD [id number] 23 Fetal Demise       OB History       1    Para   1    Term                AB        Living          SAB        IAB        Ectopic        Molar        Multiple   0    Live Births             # Outcome Date GA Labor/2nd Weight Sex Delivery Anes PTL Lv A1 A5   1 Para 23 16w5d  0.116 kg  Vag-Spont None  Fetal Demise 0 0   Name: Dale Cerrato   Location: Other   Delivering Clinician: Melissa Salzaar MD        Prenatal History    Flowsheet Row Most Recent Value   Did You Receive Prenatal Care Yes   Name Of OB Provider Bernardino in [place]   Seen By MFM (Maternal Fetal Medicine)? No   Fetal Ultrasound Abnormalities/Concerns?  No   Infant Feeding Breast Milk  [N/A]   Circumcision Planned No   Pediatrician After Birth/ Follow Up Baby Visits N/A     Dating Summary    Working CHATO: 23 set by Zaki Ulloa on 23 based on Last Menstrual Period on 22     Based On CHATO GA Diff GA User Date   Last Menstrual Period on 22 Working  Mik Ace, Winter 01/19/23     Prenatal Results    Prenatal Labs    Test Value Date Time   ABO/Rh      Antibody Scrn      Hgb      Hct      Platelets      Rubella      RPR      T. Pallidum Antibody      Urine      Hep B Surf Ag      Hep C      HIV      Gonorrhea      Chlamydia      TSH      GTT, 1 HR (Glucola)      GTT, Fasting      GTT, 1 HR      GTT, 2 HR      GTT, 3 HR        3rd Trimester    Test Value Date Time   Hgb      Hct      Platelets      Group B Strep      Antibody Scrn      TSH T. Pallidum Antibody      Hep B Surf Ag      Gonorrhea      Chlamydia       Screening/Diagnostics    Test Value Date Time   AFP Only      AFP Tetra      Hgb Electrophoresis      Amniocentesis      Cystic Fibrosis      Thalessemia      Cristian-Sachs      Canavan      PAP Smear      Beta HCG      NT      NIPT      COVID-19        Lab    Test Value Date Time   GTT, Fasting      GTT, 1HR      GTT, 2HR      GTT, 3HR      RPR      Beta HCG      CMV Ab      Toxoplasma Ab      Varicella Zoster Ab         Legend    *: Historical  View all results for this pregnancy     Mirian Vences Pending FD [[id number]]   Delivery    Birth date/time: 2023 03:05:00  Delivery type: Vaginal, Spontaneous  Delivery Providers    Delivering clinician: Joy Fournier MD  Provider Role    Obstetrician   Kassi Long Primary Nurse    Primary  Nurse    NICU Nurse    Neonatologist    Anesthesiologist    CRNA    Nurse Practitioner    Midwife    Nursery Nurse   Garfield De Anda RN Staff Nurse     Anesthesia    Method: None  Multiple Birth Onset Second Stage    No data filed  Operative Delivery    Forceps attempted?: No  Vacuum extractor attempted?: No  Apgars    Living status: Fetal Demise  Apgars:  1 min. :  5 min.:  10 min. :  15 min.:  20 min.:    Skin color:  0  0  0  0  0    Heart rate:  0  0  0  0  0    Reflex irritability:  0  0  0  0  0    Muscle tone:  0  0  0  0  0    Respiratory effort:  0  0  0  0  0    Total:  0  0  0  0  0      Resuscitation    Method: None  Shoulder Dystocia    Shoulder dystocia present?: No  Measurements    Weight: 116 g  Weight (lbs): 4.1 oz  Length: 18 cm  Length (in): 7.09\"  Lacerations    Episiotomy: None    Lacerations: None  Repair Needed:   # of Repair Packets:   Suture Type and Size:   Suture Comment:   Estimated Blood Loss (mL): 150      Placenta    Placenta Date/Time: 2023 0537  Removal: Expressed  Appearance: Normal Placenta disposition: Pathology     Vaginal Counts    Initial count personnel: Barbara LAZARO RN  Final count personnel: Wander AGEE RN  Accurate final count?: Yes  Delivery Summary History    Event User Date/Time   Signed Fran Ask 1/19/2023 07:48:13   [facility] 1/19[facility]2023 07:48:56   Signed Abdon Solis, Winter 1/19/2023 07:49:04   Opened for Addendum Fran Ask 1/19/2023 07:56:36   Signed Abdon Solis, Winter 1/19/2023 07:56:44   ===============================

## 2023-02-14 ENCOUNTER — TELEPHONE (OUTPATIENT)
Dept: OBGYN CLINIC | Age: 23
End: 2023-02-14

## 2023-02-14 NOTE — TELEPHONE ENCOUNTER
2/14/2023 @9:13am-Pt called and L/M on VM regarding scheduling an appt w/Dr. Thom Benavidez. At 9:38am, returned phone call to patient and L/M to have patient contact the office at [phone]. ..ww

## 2023-02-27 ENCOUNTER — TELEPHONE (OUTPATIENT)
Dept: OBGYN CLINIC | Age: 23
End: 2023-02-27

## 2023-02-27 NOTE — TELEPHONE ENCOUNTER
Patient called and left a message on 02/23/23 at 4:12pm to be scheduled for an appt to follow-up on her miscarriage. Called and spw the patient 02/27/23 12:11pm to schedule her an appt--patient has been scheduled for 03/06/23 at 3:15pm to see Dr. Shady Martinez as Dr. Chio Zambrano has no availability any earlier.

## 2023-03-06 ENCOUNTER — OFFICE VISIT (OUTPATIENT)
Dept: OBGYN CLINIC | Age: 23
End: 2023-03-06
Payer: OTHER GOVERNMENT

## 2023-03-06 VITALS
BODY MASS INDEX: 26.4 KG/M2 | WEIGHT: 164.25 LBS | HEIGHT: 66 IN | SYSTOLIC BLOOD PRESSURE: 130 MMHG | DIASTOLIC BLOOD PRESSURE: 76 MMHG

## 2023-03-06 DIAGNOSIS — O03.9 FETAL DEMISE DUE TO MISCARRIAGE: Primary | ICD-10-CM

## 2023-03-06 PROCEDURE — 99213 OFFICE O/P EST LOW 20 MIN: CPT | Performed by: OBSTETRICS & GYNECOLOGY

## 2023-03-06 NOTE — PROGRESS NOTES
Sari Warner is a 21 y.o. female who presents today for the following:  Chief Complaint   Patient presents with    Post-Partum Care     Pt presents for follow up from fetal demise, vaginal delivery 1-. Pt states  is setting her up with counseling but she has not seen a counselor yet//Pearl         No Known Allergies    Current Outpatient Medications   Medication Sig    ibuprofen (MOTRIN) 800 mg tablet Take 1 Tablet by mouth every six (6) hours as needed for Pain. No current facility-administered medications for this visit. Past Medical History:   Diagnosis Date    Thyroid activity decreased     Thyroid goiter        No past surgical history on file. Family History   Problem Relation Age of Onset    No Known Problems Mother     No Known Problems Father        Social History     Socioeconomic History    Marital status: SINGLE     Spouse name: Not on file    Number of children: Not on file    Years of education: Not on file    Highest education level: Not on file   Occupational History    Not on file   Tobacco Use    Smoking status: Never    Smokeless tobacco: Never   Vaping Use    Vaping Use: Never used   Substance and Sexual Activity    Alcohol use: Never    Drug use: Never    Sexual activity: Yes     Partners: Male     Birth control/protection: None   Other Topics Concern    Not on file   Social History Narrative    Not on file     Social Determinants of Health     Financial Resource Strain: Not on file   Food Insecurity: Not on file   Transportation Needs: Not on file   Physical Activity: Not on file   Stress: Not on file   Social Connections: Not on file   Intimate Partner Violence: Not on file   Housing Stability: Not on file         ROS   Review of Systems   Constitutional: Negative. HENT: Negative. Eyes: Negative. Respiratory: Negative. Cardiovascular: Negative. Gastrointestinal: Negative. Genitourinary: Negative. Musculoskeletal: Negative.     Skin: Negative. Neurological: Negative. Endo/Heme/Allergies: Negative. Psychiatric/Behavioral: Negative. /76   Ht 5' 6\" (1.676 m)   Wt 164 lb 4 oz (74.5 kg)   LMP 02/16/2023   BMI 26.51 kg/m²    OBGyn Exam   Constitutional  Appearance: well-nourished, well developed, alert, in no acute distress    HENT  Head and Face: appears normal    Chest  Respiratory Effort: breathing labored    Gastrointestinal  Abdominal Examination: abdomen non-tender to palpation, normal bowel sounds, no masses present    Genitourinary  DEFERRED  Skin  General Inspection: no rash, no lesions identified    Neurologic/Psychiatric  Mental Status:  Orientation: grossly oriented to person, place and time  Mood and Affect: mood normal, affect appropriate    No results found for this visit on 03/06/23. No orders of the defined types were placed in this encounter. 22 YO WITH HX OF RECENT FETAL DEMISE AT 12 WEEKS  COMING FOR FOLLOW UP    1.  Fetal demise due to miscarriage  - DISCUSSED MISSED AB AT 16 WEEKS  - DISCUSSED RISK FACTORS  - DOES NOT DESIRE CONTRACEPTION, IS NOT INTERESTED TO GET PREGNANT   TOLD HER TO COME IF PREGNANT FOR [facility] REFERRAL AND EARLY U/S

## 2023-04-21 ENCOUNTER — APPOINTMENT (OUTPATIENT)
Dept: GENERAL RADIOLOGY | Age: 23
End: 2023-04-21
Attending: EMERGENCY MEDICINE
Payer: OTHER GOVERNMENT

## 2023-04-21 ENCOUNTER — HOSPITAL ENCOUNTER (EMERGENCY)
Age: 23
Discharge: HOME | End: 2023-04-21
Attending: EMERGENCY MEDICINE
Payer: OTHER GOVERNMENT

## 2023-04-21 VITALS
TEMPERATURE: 97.5 F | WEIGHT: 160 LBS | SYSTOLIC BLOOD PRESSURE: 102 MMHG | BODY MASS INDEX: 25.71 KG/M2 | HEART RATE: 96 BPM | HEIGHT: 66 IN | RESPIRATION RATE: 24 BRPM | DIASTOLIC BLOOD PRESSURE: 52 MMHG | OXYGEN SATURATION: 99 %

## 2023-04-21 DIAGNOSIS — L29.9 PRURITUS: Primary | ICD-10-CM

## 2023-04-21 DIAGNOSIS — E04.9 GOITER: ICD-10-CM

## 2023-04-21 DIAGNOSIS — L50.9 URTICARIA: ICD-10-CM

## 2023-04-21 LAB
ALBUMIN SERPL-MCNC: 3.3 G/DL (ref 3.5–5)
ALBUMIN/GLOB SERPL: 1.2 (ref 1.1–2.2)
ALP SERPL-CCNC: 48 U/L (ref 45–117)
ALT SERPL-CCNC: 14 U/L (ref 12–78)
ANION GAP SERPL CALC-SCNC: 6 MMOL/L (ref 5–15)
APPEARANCE UR: CLEAR
AST SERPL W P-5'-P-CCNC: 15 U/L (ref 15–37)
BACTERIA URNS QL MICRO: NEGATIVE /HPF
BASOPHILS # BLD: 0 K/UL (ref 0–0.1)
BASOPHILS NFR BLD: 0 % (ref 0–1)
BILIRUB SERPL-MCNC: 0.2 MG/DL (ref 0.2–1)
BILIRUB UR QL: NEGATIVE
BUN SERPL-MCNC: 10 MG/DL (ref 6–20)
BUN/CREAT SERPL: 10 (ref 12–20)
CA-I BLD-MCNC: 8.8 MG/DL (ref 8.5–10.1)
CHLORIDE SERPL-SCNC: 109 MMOL/L (ref 97–108)
CO2 SERPL-SCNC: 24 MMOL/L (ref 21–32)
COLOR UR: ABNORMAL
CREAT SERPL-MCNC: 1.01 MG/DL (ref 0.55–1.02)
DIFFERENTIAL METHOD BLD: ABNORMAL
EOSINOPHIL # BLD: 0.1 K/UL (ref 0–0.4)
EOSINOPHIL NFR BLD: 3 % (ref 0–7)
EPITH CASTS URNS QL MICRO: ABNORMAL /LPF
ERYTHROCYTE [DISTWIDTH] IN BLOOD BY AUTOMATED COUNT: 14 % (ref 11.5–14.5)
GLOBULIN SER CALC-MCNC: 2.8 G/DL (ref 2–4)
GLUCOSE SERPL-MCNC: 107 MG/DL (ref 65–100)
GLUCOSE UR STRIP.AUTO-MCNC: NEGATIVE MG/DL
HCG SERPL QL: NEGATIVE
HCT VFR BLD AUTO: 37.4 % (ref 35–47)
HGB BLD-MCNC: 11.8 G/DL (ref 11.5–16)
HGB UR QL STRIP: NEGATIVE
HYALINE CASTS URNS QL MICRO: >20 /LPF (ref 0–5)
IMM GRANULOCYTES # BLD AUTO: 0 K/UL (ref 0–0.04)
IMM GRANULOCYTES NFR BLD AUTO: 0 % (ref 0–0.5)
KETONES UR QL STRIP.AUTO: NEGATIVE MG/DL
LEUKOCYTE ESTERASE UR QL STRIP.AUTO: NEGATIVE
LYMPHOCYTES # BLD: 2 K/UL (ref 0.8–3.5)
LYMPHOCYTES NFR BLD: 40 % (ref 12–49)
MCH RBC QN AUTO: 25.9 PG (ref 26–34)
MCHC RBC AUTO-ENTMCNC: 31.6 G/DL (ref 30–36.5)
MCV RBC AUTO: 82 FL (ref 80–99)
MONOCYTES # BLD: 0.1 K/UL (ref 0–1)
MONOCYTES NFR BLD: 2 % (ref 5–13)
MUCOUS THREADS URNS QL MICRO: ABNORMAL /LPF
NEUTS SEG # BLD: 2.7 K/UL (ref 1.8–8)
NEUTS SEG NFR BLD: 55 % (ref 32–75)
NITRITE UR QL STRIP.AUTO: NEGATIVE
NRBC # BLD: 0 K/UL (ref 0–0.01)
NRBC BLD-RTO: 0 PER 100 WBC
PH UR STRIP: 7 (ref 5–8)
PLATELET # BLD AUTO: 267 K/UL (ref 150–400)
PMV BLD AUTO: 12.3 FL (ref 8.9–12.9)
POTASSIUM SERPL-SCNC: 3.7 MMOL/L (ref 3.5–5.1)
PROT SERPL-MCNC: 6.1 G/DL (ref 6.4–8.2)
PROT UR STRIP-MCNC: 100 MG/DL
RBC # BLD AUTO: 4.56 M/UL (ref 3.8–5.2)
RBC #/AREA URNS HPF: ABNORMAL /HPF (ref 0–5)
SODIUM SERPL-SCNC: 139 MMOL/L (ref 136–145)
SP GR UR REFRACTOMETRY: 1.02 (ref 1–1.03)
TROPONIN I SERPL HS-MCNC: 4 NG/L (ref 0–51)
UA: UC IF INDICATED,UAUC: ABNORMAL
UROBILINOGEN UR QL STRIP.AUTO: 0.1 EU/DL (ref 0.1–1)
WBC # BLD AUTO: 5 K/UL (ref 3.6–11)
WBC URNS QL MICRO: ABNORMAL /HPF (ref 0–4)

## 2023-04-21 PROCEDURE — 80053 COMPREHEN METABOLIC PANEL: CPT

## 2023-04-21 PROCEDURE — 36415 COLL VENOUS BLD VENIPUNCTURE: CPT

## 2023-04-21 PROCEDURE — 85025 COMPLETE CBC W/AUTO DIFF WBC: CPT

## 2023-04-21 PROCEDURE — 84484 ASSAY OF TROPONIN QUANT: CPT

## 2023-04-21 PROCEDURE — 99285 EMERGENCY DEPT VISIT HI MDM: CPT

## 2023-04-21 PROCEDURE — 84703 CHORIONIC GONADOTROPIN ASSAY: CPT

## 2023-04-21 PROCEDURE — 96374 THER/PROPH/DIAG INJ IV PUSH: CPT

## 2023-04-21 PROCEDURE — 93005 ELECTROCARDIOGRAM TRACING: CPT

## 2023-04-21 PROCEDURE — 71045 X-RAY EXAM CHEST 1 VIEW: CPT

## 2023-04-21 PROCEDURE — 81001 URINALYSIS AUTO W/SCOPE: CPT

## 2023-04-21 PROCEDURE — 74011250636 HC RX REV CODE- 250/636: Performed by: EMERGENCY MEDICINE

## 2023-04-21 RX ORDER — DEXAMETHASONE SODIUM PHOSPHATE 10 MG/ML
10 INJECTION INTRAMUSCULAR; INTRAVENOUS ONCE
Status: DISCONTINUED | OUTPATIENT
Start: 2023-04-21 | End: 2023-04-22 | Stop reason: HOSPADM

## 2023-04-21 RX ORDER — PREDNISONE 20 MG/1
40 TABLET ORAL DAILY
Qty: 6 TABLET | Refills: 0 | Status: SHIPPED | OUTPATIENT
Start: 2023-04-21 | End: 2023-04-24

## 2023-04-21 RX ORDER — DIPHENHYDRAMINE HYDROCHLORIDE 50 MG/ML
25 INJECTION, SOLUTION INTRAMUSCULAR; INTRAVENOUS
Status: COMPLETED | OUTPATIENT
Start: 2023-04-21 | End: 2023-04-21

## 2023-04-21 RX ADMIN — DIPHENHYDRAMINE HYDROCHLORIDE 25 MG: 50 INJECTION, SOLUTION INTRAMUSCULAR; INTRAVENOUS at 22:43

## 2023-04-21 RX ADMIN — SODIUM CHLORIDE 1000 ML: 9 INJECTION, SOLUTION INTRAVENOUS at 21:23

## 2023-04-21 RX ADMIN — SODIUM CHLORIDE 1000 ML: 9 INJECTION, SOLUTION INTRAVENOUS at 21:24

## 2023-04-22 LAB
ATRIAL RATE: 119 BPM
CALCULATED P AXIS, ECG09: 57 DEGREES
CALCULATED R AXIS, ECG10: 17 DEGREES
CALCULATED T AXIS, ECG11: 25 DEGREES
DIAGNOSIS, 93000: NORMAL
P-R INTERVAL, ECG05: 150 MS
Q-T INTERVAL, ECG07: 334 MS
QRS DURATION, ECG06: 64 MS
QTC CALCULATION (BEZET), ECG08: 469 MS
VENTRICULAR RATE, ECG03: 119 BPM

## 2023-04-22 NOTE — ED TRIAGE NOTES
Per ems pt was at the gym working out when she started feeling weak and getting hives. Per pt she had not eaten anything all day.

## 2023-04-22 NOTE — DISCHARGE INSTRUCTIONS
Return if your symptoms are worsening or changing in any way in the meantime. Follow-up with your primary care doctor and your surgeon for discussion of the thyroid and the goiter. Return specifically for difficulty breathing, difficulty or inability to swallow or speak.

## 2023-04-22 NOTE — ED PROVIDER NOTES
EMERGENCY DEPARTMENT HISTORY AND PHYSICAL EXAM      Date: 4/21/2023  Patient Name: Neri Brown      History of Presenting Illness     Chief Complaint   Patient presents with    Fatigue       History Provided By: Patient  Location/Duration/Severity/Modifying factors   Patient is a 68-year-old female who presents to the ED with a chief complaint of generalized weakness and concern for itching of the skin. Patient states that symptoms started about 2 hours ago. She was on a treadmill at the gym when she developed onset of the symptoms initially with a warm sensation on her face and trunk spreading to her arms and legs. She had a similar episode in October while she was on the treadmill. She says that she followed up with an allergist at 1 point in the past.  She denies any vomiting or diarrhea. She had an episode of chest pain at home that has since resolved. Patient also made mention that she has had fast heart rate in the past.        There are no other complaints, changes, or physical findings at this time. PCP: Other, MD ySdnee    Current Outpatient Medications   Medication Sig Dispense Refill    predniSONE (DELTASONE) 20 mg tablet Take 2 Tablets by mouth daily for 3 days. With Breakfast 6 Tablet 0    ibuprofen (MOTRIN) 800 mg tablet Take 1 Tablet by mouth every six (6) hours as needed for Pain. 40 Tablet 0       Past History     Past Medical History:  Past Medical History:   Diagnosis Date    Thyroid activity decreased     Thyroid goiter        Past Surgical History:  No past surgical history on file.     Family History:  Family History   Problem Relation Age of Onset    No Known Problems Mother     No Known Problems Father        Social History:  Social History     Tobacco Use    Smoking status: Never    Smokeless tobacco: Never   Vaping Use    Vaping Use: Never used   Substance Use Topics    Alcohol use: Never    Drug use: Never       Allergies:  No Known Allergies    Medications:  Current Outpatient Medications   Medication Sig Dispense Refill    predniSONE (DELTASONE) 20 mg tablet Take 2 Tablets by mouth daily for 3 days. With Breakfast 6 Tablet 0    ibuprofen (MOTRIN) 800 mg tablet Take 1 Tablet by mouth every six (6) hours as needed for Pain. 40 Tablet 0       Social Determinants of Health:  Social Determinants of Health     Tobacco Use: Low Risk     Smoking Tobacco Use: Never    Smokeless Tobacco Use: Never    Passive Exposure: Not on file   Alcohol Use: Not on file   Financial Resource Strain: Not on file   Food Insecurity: Not on file   Transportation Needs: Not on file   Physical Activity: Not on file   Stress: Not on file   Social Connections: Not on file   Intimate Partner Violence: Not on file   Depression: Not on file   Housing Stability: Not on file   Postpartum Depression: Low Risk     Last EPDS Total Score: 4    Last EPDS Self Harm Result: Never     Good access to primary and/or specialist care. Reports generally stable financial, home and living environment. Physical Exam     Physical Exam  Vitals and nursing note reviewed. Constitutional:       General: She is not in acute distress. Appearance: Normal appearance. Comments: There are no visible hives   HENT:      Head: Normocephalic and atraumatic. Right Ear: External ear normal.      Left Ear: External ear normal.      Nose: Nose normal. No congestion or rhinorrhea. Mouth/Throat:      Mouth: Mucous membranes are moist.      Pharynx: Oropharynx is clear. No oropharyngeal exudate or posterior oropharyngeal erythema. Eyes:      Conjunctiva/sclera: Conjunctivae normal.      Pupils: Pupils are equal, round, and reactive to light. Cardiovascular:      Rate and Rhythm: Regular rhythm. Tachycardia present. Pulses: Normal pulses. Heart sounds: Normal heart sounds. No murmur heard. Pulmonary:      Effort: Pulmonary effort is normal.      Breath sounds: Normal breath sounds.  No wheezing, rhonchi or rales.   Abdominal:      General: Abdomen is flat. Tenderness: There is no abdominal tenderness. There is no guarding or rebound. Musculoskeletal:         General: No swelling or tenderness. Normal range of motion. Cervical back: Normal range of motion and neck supple. Right lower leg: No edema. Left lower leg: No edema. Skin:     General: Skin is warm and dry. Capillary Refill: Capillary refill takes less than 2 seconds. Findings: No rash. Neurological:      General: No focal deficit present. Mental Status: She is alert. Cranial Nerves: No cranial nerve deficit. Sensory: No sensory deficit. Motor: No weakness. Lab and Diagnostic Study Results     Labs -  No results found for this or any previous visit (from the past 24 hour(s)). Radiologic Studies -   XR CHEST PORT   Final Result   No Acute Disease. EKG interpretations: See ED Course for interpretation of my EKG(s)    Xray Interpretations (preliminary): N/A    Please see the full medical record for comprehensive list of labs and imaging obtained during the visit. All labs and imaging studies were personally reviewed. Medical Decision Making and ED Course   - I am the first and primary provider for this patient AND AM THE PRIMARY PROVIDER OF RECORD. - I reviewed the vital signs, available nursing notes, past medical history, past surgical history, family history and social history. - Initial assessment performed. The patients presenting problems have been discussed, and the staff are in agreement with the care plan formulated and outlined with them. I have encouraged them to ask questions as they arise throughout their visit. Vital Signs-Reviewed the patient's vital signs.     Patient Vitals for the past 24 hrs:   Pulse BP SpO2   04/21/23 2311 96 -- --   04/21/23 2300 -- (!) 102/52 99 %           Nursing notes and pertinent past medical history including imaging and labs have been reviewed (if available)      Provider Notes (Medical Decision Making):     MDM  Number of Diagnoses or Management Options  Goiter  Pruritus  Urticaria  Diagnosis management comments: Patient presents with a warm sensation and concerning for hives and fatigue while exercising. Her EKG shows a sinus tachycardia with no acute ischemic changes. She describes a near syncopal sensation. Differential for syncope would include vasovagal syncope, orthostasis, dehydration, arrhythmia such as Brugada, Janie-Parkinson-White, arrhythmogenic right ventricular dysplasia, PE, anemia, etc.  The patient's EKG is reassuring there is no evidence of any arrhythmia or prearrhythmic state. Likewise PE is very unlikely given patient is not hypoxic nor tachycardic. Not tachypneic no risk factors. Unlikely pulmonary embolism patient denies any chest pain or shortness of breath at present. She states in the past when this happened she received IV fluids and felt better. ED Course:     ED Course as of 04/22/23 2209 Fri Apr 21, 2023 2227 On reassessment the patient she states she is feeling better she states that she feels as if she is having allergic reaction as she had previously and I will try some Benadryl to see if that can provide some relief. [BARTOLO]   2459 EKG interpretation sinus tachycardia rate of 119 bpm, normal axis no ST elevation or ST depression. No T wave inversions. Overall sinus tachycardia with no overt ischemic changes [BARTOLO]   8263 Patient reassessed, resting comfortably. Feeling better. She states previously they gave her steroids when this happened so I think it would be reasonable to do today for exercise-induced urticaria. No stridor, denies any oral symptoms. We can discharge home, she has Benadryl and Claritin at home to take. [BARTOLO]   9090 Patient states she had a thyroid level checked about a month ago.   It looks like more about 7 weeks ago but they were all normal so this is unlikely thyrotoxicosis. [BARTOLO]      ED Course User Index  [BARTOLO] Bin Kramer DO       This appears to be an acute condition. _________________________________________________________________  N/A    Procedures and Critical Care   Performed by: Sara Blake DO  Procedures     Sara Blake DO      Diagnosis:   1. Pruritus    2. Urticaria    3. Goiter          Disposition: Discharged Home    DISCHARGE: At this time, patient is stable and appropriate for discharge home. Patient demonstrates understanding of current diagnoses and is in agreement with the treatment plan. They are advised that while the likelihood of serious underlying condition is low at this point given the evaluation performed today, we cannot fully rule it out. They are advised to immediately return with any new symptoms or worsening of current condition. Any Incidental findings were noted on the patient's discharge paperwork as well as verbally directly to the patient, and the appropriate follow-up was given to the patient as far as instructions on testing needed as well as the timeframe. All questions have been answered. Patient is given educational material regarding their diagnoses, including danger symptoms and when to return to the ED. Follow-up Information       Follow up With Specialties Details Why [address] EMERGENCY DEPT Emergency Medicine  As needed, If symptoms worsen; or [facility] Emergency Department [address] [address]  [phone]    Your Primary Care Physician  In 3 days              Discharge Medication List as of 4/21/2023 11:23 PM        START taking these medications    Details   predniSONE (DELTASONE) 20 mg tablet Take 2 Tablets by mouth daily for 3 days.  With Breakfast, Normal, Disp-6 Tablet, R-0           CONTINUE these medications which have NOT CHANGED    Details   ibuprofen (MOTRIN) 800 mg tablet Take 1 Tablet by mouth every six (6) hours as needed for Pain., Print, Disp-40 Tablet, R-0             Patient seen in the context of the Novel Coronavirus (COVID19) pandemic, utilizing contemporary protocols and evidence based on the most up to date available evidence, understanding that the current evidence has the potential to change as additional information becomes available. This note is dictated utilizing Dragon voice recognition software. Unfortunately this leads to occasional typographical errors using the voice recognition. I apologize in advance if the situation occurs. If questions occur please do not hesitate to contact me directly.     Zulema Fuentes, DO